# Patient Record
Sex: FEMALE | URBAN - METROPOLITAN AREA
[De-identification: names, ages, dates, MRNs, and addresses within clinical notes are randomized per-mention and may not be internally consistent; named-entity substitution may affect disease eponyms.]

---

## 2022-02-07 ENCOUNTER — OFFICE VISIT (OUTPATIENT)
Dept: URGENT CARE | Facility: URGENT CARE | Age: 32
End: 2022-02-07

## 2022-02-07 VITALS
OXYGEN SATURATION: 98 % | HEART RATE: 92 BPM | SYSTOLIC BLOOD PRESSURE: 122 MMHG | DIASTOLIC BLOOD PRESSURE: 81 MMHG | WEIGHT: 174 LBS

## 2022-02-07 DIAGNOSIS — R10.84 ABDOMINAL PAIN, GENERALIZED: Primary | ICD-10-CM

## 2022-02-07 LAB
ALBUMIN SERPL-MCNC: 3.6 G/DL (ref 3.4–5)
ALBUMIN UR-MCNC: NEGATIVE MG/DL
ALP SERPL-CCNC: 102 U/L (ref 40–150)
ALT SERPL W P-5'-P-CCNC: 17 U/L (ref 0–50)
AMYLASE SERPL-CCNC: 46 U/L (ref 30–110)
ANION GAP SERPL CALCULATED.3IONS-SCNC: 5 MMOL/L (ref 3–14)
APPEARANCE UR: CLEAR
AST SERPL W P-5'-P-CCNC: 17 U/L (ref 0–45)
BASOPHILS # BLD AUTO: 0 10E3/UL (ref 0–0.2)
BASOPHILS NFR BLD AUTO: 0 %
BILIRUB SERPL-MCNC: 0.3 MG/DL (ref 0.2–1.3)
BILIRUB UR QL STRIP: NEGATIVE
BUN SERPL-MCNC: 13 MG/DL (ref 7–30)
CALCIUM SERPL-MCNC: 9.2 MG/DL (ref 8.5–10.1)
CHLORIDE BLD-SCNC: 106 MMOL/L (ref 94–109)
CO2 SERPL-SCNC: 26 MMOL/L (ref 20–32)
COLOR UR AUTO: YELLOW
CREAT SERPL-MCNC: 0.59 MG/DL (ref 0.52–1.04)
EOSINOPHIL # BLD AUTO: 0.6 10E3/UL (ref 0–0.7)
EOSINOPHIL NFR BLD AUTO: 7 %
ERYTHROCYTE [DISTWIDTH] IN BLOOD BY AUTOMATED COUNT: 12.3 % (ref 10–15)
GFR SERPL CREATININE-BSD FRML MDRD: >90 ML/MIN/1.73M2
GLUCOSE BLD-MCNC: 140 MG/DL (ref 70–99)
GLUCOSE UR STRIP-MCNC: NEGATIVE MG/DL
HCG UR QL: NEGATIVE
HCT VFR BLD AUTO: 43.8 % (ref 35–47)
HGB BLD-MCNC: 14.1 G/DL (ref 11.7–15.7)
HGB UR QL STRIP: NEGATIVE
KETONES UR STRIP-MCNC: NEGATIVE MG/DL
LEUKOCYTE ESTERASE UR QL STRIP: NEGATIVE
LIPASE SERPL-CCNC: 97 U/L (ref 73–393)
LYMPHOCYTES # BLD AUTO: 1.9 10E3/UL (ref 0.8–5.3)
LYMPHOCYTES NFR BLD AUTO: 22 %
MCH RBC QN AUTO: 29.1 PG (ref 26.5–33)
MCHC RBC AUTO-ENTMCNC: 32.2 G/DL (ref 31.5–36.5)
MCV RBC AUTO: 91 FL (ref 78–100)
MONOCYTES # BLD AUTO: 0.6 10E3/UL (ref 0–1.3)
MONOCYTES NFR BLD AUTO: 7 %
NEUTROPHILS # BLD AUTO: 5.5 10E3/UL (ref 1.6–8.3)
NEUTROPHILS NFR BLD AUTO: 65 %
NITRATE UR QL: NEGATIVE
PH UR STRIP: 6 [PH] (ref 5–7)
PLATELET # BLD AUTO: 299 10E3/UL (ref 150–450)
POTASSIUM BLD-SCNC: 3.8 MMOL/L (ref 3.4–5.3)
PROT SERPL-MCNC: 8.2 G/DL (ref 6.8–8.8)
RBC # BLD AUTO: 4.84 10E6/UL (ref 3.8–5.2)
SODIUM SERPL-SCNC: 137 MMOL/L (ref 133–144)
SP GR UR STRIP: >=1.03 (ref 1–1.03)
UROBILINOGEN UR STRIP-ACNC: 0.2 E.U./DL
WBC # BLD AUTO: 8.6 10E3/UL (ref 4–11)

## 2022-02-07 PROCEDURE — 81003 URINALYSIS AUTO W/O SCOPE: CPT | Performed by: FAMILY MEDICINE

## 2022-02-07 PROCEDURE — 80053 COMPREHEN METABOLIC PANEL: CPT | Performed by: FAMILY MEDICINE

## 2022-02-07 PROCEDURE — 83690 ASSAY OF LIPASE: CPT | Performed by: FAMILY MEDICINE

## 2022-02-07 PROCEDURE — 82150 ASSAY OF AMYLASE: CPT | Performed by: FAMILY MEDICINE

## 2022-02-07 PROCEDURE — 36415 COLL VENOUS BLD VENIPUNCTURE: CPT | Performed by: FAMILY MEDICINE

## 2022-02-07 PROCEDURE — 85025 COMPLETE CBC W/AUTO DIFF WBC: CPT | Performed by: FAMILY MEDICINE

## 2022-02-07 PROCEDURE — 81025 URINE PREGNANCY TEST: CPT | Performed by: FAMILY MEDICINE

## 2022-02-07 PROCEDURE — 99203 OFFICE O/P NEW LOW 30 MIN: CPT | Performed by: FAMILY MEDICINE

## 2022-02-08 NOTE — PROGRESS NOTES
"SUBJECTIVE  HPI: Parvin Alfonso is a 31 year old female  who presents with the CC of abdominal/pelvic pain.   Pain is located in the generalized area, with radiation to None    The pain is characterized as \"pain\".    Pain has been present for  day(s) and is stable.     EXACERBATING FACTORS: NEGATIVE.   RELIEVING FACTORS: NEGATIVE.      No past medical history on file.  No Known Allergies  Social History     Tobacco Use     Smoking status: Not on file     Smokeless tobacco: Not on file   Substance Use Topics     Alcohol use: Not on file       ROS:CONSTITUTIONAL:NEGATIVE for fever, chills, change in weight    EXAMINATION:  /81 (BP Location: Right arm, Patient Position: Sitting, Cuff Size: Adult Regular)   Pulse 92   Wt 78.9 kg (174 lb)   SpO2 98% GENERAL APPEARANCE: healthy, alert and no distress  ABDOMEN:  soft, nontender, no HSM or masses and bowel sounds normal      ICD-10-CM    1. Abdominal pain, generalized  R10.84 CBC with Platelets & Differential     HCG Qual, Urine ([id number])     UA reflex to Microscopic and Culture     Lipase     Amylase     Comprehensive metabolic panel     omeprazole (PRILOSEC) 20 MG DR capsule       F/U PCP/IM/FP, ED if worse    "

## 2022-02-12 ENCOUNTER — APPOINTMENT (OUTPATIENT)
Dept: ULTRASOUND IMAGING | Facility: CLINIC | Age: 32
End: 2022-02-12
Attending: EMERGENCY MEDICINE

## 2022-02-12 ENCOUNTER — HOSPITAL ENCOUNTER (EMERGENCY)
Facility: CLINIC | Age: 32
Discharge: HOME | End: 2022-02-12
Attending: EMERGENCY MEDICINE | Admitting: EMERGENCY MEDICINE

## 2022-02-12 ENCOUNTER — APPOINTMENT (OUTPATIENT)
Dept: CT IMAGING | Facility: CLINIC | Age: 32
End: 2022-02-12
Attending: EMERGENCY MEDICINE

## 2022-02-12 VITALS
BODY MASS INDEX: 26.45 KG/M2 | SYSTOLIC BLOOD PRESSURE: 98 MMHG | HEIGHT: 65 IN | TEMPERATURE: 98.1 F | HEART RATE: 86 BPM | OXYGEN SATURATION: 99 % | RESPIRATION RATE: 18 BRPM | DIASTOLIC BLOOD PRESSURE: 64 MMHG | WEIGHT: 158.73 LBS

## 2022-02-12 DIAGNOSIS — R07.9 CHEST PAIN, UNSPECIFIED TYPE: ICD-10-CM

## 2022-02-12 DIAGNOSIS — R10.84 ABDOMINAL PAIN, GENERALIZED: ICD-10-CM

## 2022-02-12 LAB
ALBUMIN SERPL-MCNC: 3.8 G/DL (ref 3.4–5)
ALBUMIN UR-MCNC: NEGATIVE MG/DL
ALP SERPL-CCNC: 100 U/L (ref 40–150)
ALT SERPL W P-5'-P-CCNC: 18 U/L (ref 0–50)
ANION GAP SERPL CALCULATED.3IONS-SCNC: 7 MMOL/L (ref 3–14)
APPEARANCE UR: CLEAR
AST SERPL W P-5'-P-CCNC: 29 U/L (ref 0–45)
BASOPHILS # BLD AUTO: 0 10E3/UL (ref 0–0.2)
BASOPHILS NFR BLD AUTO: 0 %
BILIRUB SERPL-MCNC: 0.3 MG/DL (ref 0.2–1.3)
BILIRUB UR QL STRIP: NEGATIVE
BUN SERPL-MCNC: 14 MG/DL (ref 7–30)
CALCIUM SERPL-MCNC: 9 MG/DL (ref 8.5–10.1)
CHLORIDE BLD-SCNC: 105 MMOL/L (ref 94–109)
CO2 SERPL-SCNC: 24 MMOL/L (ref 20–32)
COLOR UR AUTO: ABNORMAL
CREAT SERPL-MCNC: 0.62 MG/DL (ref 0.52–1.04)
EOSINOPHIL # BLD AUTO: 0.5 10E3/UL (ref 0–0.7)
EOSINOPHIL NFR BLD AUTO: 4 %
ERYTHROCYTE [DISTWIDTH] IN BLOOD BY AUTOMATED COUNT: 12.1 % (ref 10–15)
GFR SERPL CREATININE-BSD FRML MDRD: >90 ML/MIN/1.73M2
GLUCOSE BLD-MCNC: 160 MG/DL (ref 70–99)
GLUCOSE UR STRIP-MCNC: NEGATIVE MG/DL
HCG SERPL QL: NEGATIVE
HCT VFR BLD AUTO: 41.4 % (ref 35–47)
HGB BLD-MCNC: 14 G/DL (ref 11.7–15.7)
HGB UR QL STRIP: NEGATIVE
IMM GRANULOCYTES # BLD: 0.1 10E3/UL
IMM GRANULOCYTES NFR BLD: 1 %
KETONES UR STRIP-MCNC: NEGATIVE MG/DL
LEUKOCYTE ESTERASE UR QL STRIP: ABNORMAL
LIPASE SERPL-CCNC: 84 U/L (ref 73–393)
LYMPHOCYTES # BLD AUTO: 2.7 10E3/UL (ref 0.8–5.3)
LYMPHOCYTES NFR BLD AUTO: 21 %
MCH RBC QN AUTO: 30.3 PG (ref 26.5–33)
MCHC RBC AUTO-ENTMCNC: 33.8 G/DL (ref 31.5–36.5)
MCV RBC AUTO: 90 FL (ref 78–100)
MONOCYTES # BLD AUTO: 0.6 10E3/UL (ref 0–1.3)
MONOCYTES NFR BLD AUTO: 5 %
MUCOUS THREADS #/AREA URNS LPF: PRESENT /LPF
NEUTROPHILS # BLD AUTO: 8.8 10E3/UL (ref 1.6–8.3)
NEUTROPHILS NFR BLD AUTO: 69 %
NITRATE UR QL: NEGATIVE
NRBC # BLD AUTO: 0 10E3/UL
NRBC BLD AUTO-RTO: 0 /100
PH UR STRIP: 5.5 [PH] (ref 5–7)
PLATELET # BLD AUTO: 292 10E3/UL (ref 150–450)
POTASSIUM BLD-SCNC: 3.7 MMOL/L (ref 3.4–5.3)
PROT SERPL-MCNC: 8.2 G/DL (ref 6.8–8.8)
RBC # BLD AUTO: 4.62 10E6/UL (ref 3.8–5.2)
RBC URINE: 2 /HPF
SODIUM SERPL-SCNC: 136 MMOL/L (ref 133–144)
SP GR UR STRIP: 1.01 (ref 1–1.03)
SQUAMOUS EPITHELIAL: 6 /HPF
TROPONIN I SERPL HS-MCNC: 7 NG/L
UROBILINOGEN UR STRIP-MCNC: NORMAL MG/DL
WBC # BLD AUTO: 12.8 10E3/UL (ref 4–11)
WBC URINE: 8 /HPF

## 2022-02-12 PROCEDURE — 93005 ELECTROCARDIOGRAM TRACING: CPT

## 2022-02-12 PROCEDURE — 85025 COMPLETE CBC W/AUTO DIFF WBC: CPT | Performed by: EMERGENCY MEDICINE

## 2022-02-12 PROCEDURE — 84703 CHORIONIC GONADOTROPIN ASSAY: CPT | Performed by: EMERGENCY MEDICINE

## 2022-02-12 PROCEDURE — 87086 URINE CULTURE/COLONY COUNT: CPT | Performed by: EMERGENCY MEDICINE

## 2022-02-12 PROCEDURE — 258N000003 HC RX IP 258 OP 636: Performed by: EMERGENCY MEDICINE

## 2022-02-12 PROCEDURE — 36415 COLL VENOUS BLD VENIPUNCTURE: CPT | Performed by: EMERGENCY MEDICINE

## 2022-02-12 PROCEDURE — 96361 HYDRATE IV INFUSION ADD-ON: CPT

## 2022-02-12 PROCEDURE — 76705 ECHO EXAM OF ABDOMEN: CPT

## 2022-02-12 PROCEDURE — 250N000009 HC RX 250: Performed by: EMERGENCY MEDICINE

## 2022-02-12 PROCEDURE — 81001 URINALYSIS AUTO W/SCOPE: CPT | Performed by: EMERGENCY MEDICINE

## 2022-02-12 PROCEDURE — 83690 ASSAY OF LIPASE: CPT | Performed by: EMERGENCY MEDICINE

## 2022-02-12 PROCEDURE — 250N000011 HC RX IP 250 OP 636: Performed by: EMERGENCY MEDICINE

## 2022-02-12 PROCEDURE — 96375 TX/PRO/DX INJ NEW DRUG ADDON: CPT

## 2022-02-12 PROCEDURE — 96374 THER/PROPH/DIAG INJ IV PUSH: CPT | Mod: 59

## 2022-02-12 PROCEDURE — 99285 EMERGENCY DEPT VISIT HI MDM: CPT | Mod: 25

## 2022-02-12 PROCEDURE — 80053 COMPREHEN METABOLIC PANEL: CPT | Performed by: EMERGENCY MEDICINE

## 2022-02-12 PROCEDURE — 84484 ASSAY OF TROPONIN QUANT: CPT | Performed by: EMERGENCY MEDICINE

## 2022-02-12 PROCEDURE — 74177 CT ABD & PELVIS W/CONTRAST: CPT

## 2022-02-12 RX ORDER — ONDANSETRON 2 MG/ML
4 INJECTION INTRAMUSCULAR; INTRAVENOUS
Status: DISCONTINUED | OUTPATIENT
Start: 2022-02-12 | End: 2022-02-12 | Stop reason: HOSPADM

## 2022-02-12 RX ORDER — ONDANSETRON 4 MG/1
4 TABLET, ORALLY DISINTEGRATING ORAL EVERY 6 HOURS PRN
Qty: 15 TABLET | Refills: 0 | Status: SHIPPED | OUTPATIENT
Start: 2022-02-12

## 2022-02-12 RX ORDER — ACETAMINOPHEN 500 MG
500-1000 TABLET ORAL EVERY 6 HOURS PRN
Qty: 60 TABLET | Refills: 0 | Status: SHIPPED | OUTPATIENT
Start: 2022-02-12 | End: 2022-02-20

## 2022-02-12 RX ORDER — HYDROMORPHONE HYDROCHLORIDE 1 MG/ML
0.5 INJECTION, SOLUTION INTRAMUSCULAR; INTRAVENOUS; SUBCUTANEOUS
Status: DISCONTINUED | OUTPATIENT
Start: 2022-02-12 | End: 2022-02-12 | Stop reason: HOSPADM

## 2022-02-12 RX ORDER — IOPAMIDOL 755 MG/ML
80 INJECTION, SOLUTION INTRAVASCULAR ONCE
Status: COMPLETED | OUTPATIENT
Start: 2022-02-12 | End: 2022-02-12

## 2022-02-12 RX ADMIN — ONDANSETRON 4 MG: 2 INJECTION INTRAMUSCULAR; INTRAVENOUS at 05:13

## 2022-02-12 RX ADMIN — SODIUM CHLORIDE 1000 ML: 9 INJECTION, SOLUTION INTRAVENOUS at 05:13

## 2022-02-12 RX ADMIN — SODIUM CHLORIDE 63 ML: 900 INJECTION INTRAVENOUS at 07:27

## 2022-02-12 RX ADMIN — HYDROMORPHONE HYDROCHLORIDE 0.5 MG: 1 INJECTION, SOLUTION INTRAMUSCULAR; INTRAVENOUS; SUBCUTANEOUS at 05:13

## 2022-02-12 RX ADMIN — SODIUM CHLORIDE 1000 ML: 9 INJECTION, SOLUTION INTRAVENOUS at 06:45

## 2022-02-12 RX ADMIN — IOPAMIDOL 80 ML: 755 INJECTION, SOLUTION INTRAVENOUS at 07:26

## 2022-02-12 ASSESSMENT — MIFFLIN-ST. JEOR: SCORE: 1445.88

## 2022-02-12 NOTE — ED PROVIDER NOTES
Signout taken from Dr. Islas.  Patient presented with chest and abdominal pain for about the last 10 days apparently.  She was initially hypotensive, however that resolved with IV fluids.  Her CT scan of her chest, abdomen, and pelvis does not show anything acute.  Therefore she is safe for discharge per Dr. Islas's discharge instructions.     Dano Gordon MD  02/12/22 0808

## 2022-02-12 NOTE — ED PROVIDER NOTES
"History   Chief Complaint:  Chest and abdominal pain      HPI   History supplemented by electronic chart review  Hx limited by language barrier, provided primarily by  at bedside via official     Chela Nichols is a 31 year old female who presents with her  for evaluation of chest and abdominal pain.  No prior abdominal surgeries.  Upper abdominal pain with vomiting after dinner last night, though upper abdominal pain present to various degrees for several weeks.  Omprazole prescribed 2/7/22 not helping.  LMP 2 weeks ago.  No fevers or cough.  No diarrhea or urinary symptoms.  Pain radiates up to her chest.  No history of cardiac disease.  No tobacco or alcohol.    Review of Systems  History limited by language barrier    Allergies:  No Known Allergies     Medications:    acetaminophen (TYLENOL) 500 MG tablet  ondansetron (ZOFRAN ODT) 4 MG ODT tab        Past Medical History:    No past medical history on file.    There are no problems to display for this patient.       Past Surgical History:    No past surgical history on file.     Family History:    family history is not on file.    Social History:   Here with .  Living in hotel here after immigrating from [facility] to [place] 5 months ago, coming to [place] 10 days ago.    Physical Exam     Patient Vitals for the past 24 hrs:   BP Temp Temp src Pulse Resp SpO2 Height Weight   02/12/22 0645 (!) 74/53 -- -- 82 -- 96 % -- --   02/12/22 0630 (!) 78/53 -- -- 79 -- 96 % -- --   02/12/22 0620 (!) 78/57 98.1  F (36.7  C) Oral 82 -- 97 % -- --   02/12/22 0615 (!) 84/54 -- -- 82 -- 96 % -- --   02/12/22 0610 (!) 84/48 -- -- 80 -- 96 % -- --   02/12/22 0600 -- -- -- -- -- 96 % -- --   02/12/22 0515 -- -- -- 82 18 99 % -- --   02/12/22 0500 -- -- -- 78 22 98 % -- --   02/12/22 0445 -- -- -- 79 18 98 % -- --   02/12/22 0438 -- -- -- -- 16 -- -- --   02/12/22 0437 105/74 98.4  F (36.9  C) Oral 83 -- 98 % 1.651 m (5' 5\") 72 kg (158 lb 11.7 " "oz)      Physical Exam  General: Uncomfortable appearing woman recumbent in room 8,  at bedside  HENT: mucous membranes moist  CV: rate as above, regular rhythm, no lower extremity edema, no JVD, palpable symmetric radial pulses  Resp: normal effort, speaks in full phrases, no stridor, no cough observed  GI: abdomen soft and tender in epigastrium and RUQ with equivocal Frey's sign  MSK: no bony tenderness to chest  Skin: appropriately warm and dry, no erythema or vesicles to chest wall  Neuro: alert, clear speech, oriented  Psych: cooperative    Emergency Department Course   Electrocardiogram  ECG taken at 0246, ECG interpreted at 0512 by MARIELOS Islas MD  Sinus rhythm  Rate 83 bpm. DE interval 148. QRS duration 76. QTc 425      Imaging:    US Abdomen Limited   Final Result   Addendum 1 of 1   Addendum: Impression should read    \"2. NO biliary dilatation \"      Final   IMPRESSION:   1.  \"Starry roberto\" appearance of the liver is nonspecific but may be seen in the setting of hepatitis.   2.  New biliary dilatation.            CT Chest (PE) Abdomen Pelvis w Contrast    (Results Pending)        Laboratory:  Labs Ordered and Resulted from Time of ED Arrival to Time of ED Departure   COMPREHENSIVE METABOLIC PANEL - Abnormal       Result Value    Sodium 136      Potassium 3.7      Chloride 105      Carbon Dioxide (CO2) 24      Anion Gap 7      Urea Nitrogen 14      Creatinine 0.62      Calcium 9.0      Glucose 160 (*)     Alkaline Phosphatase 100      AST 29      ALT 18      Protein Total 8.2      Albumin 3.8      Bilirubin Total 0.3      GFR Estimate >90     UA MACROSCOPIC WITH REFLEX TO MICRO AND CULTURE - Abnormal    Color Urine Light Yellow      Appearance Urine Clear      Glucose Urine Negative      Bilirubin Urine Negative      Ketones Urine Negative      Specific Gravity Urine 1.015      Blood Urine Negative      pH Urine 5.5      Protein Albumin Urine Negative      Urobilinogen Urine Normal      Nitrite Urine " Negative      Leukocyte Esterase Urine Moderate (*)     Mucus Urine Present (*)     RBC Urine 2      WBC Urine 8 (*)     Squamous Epithelials Urine 6 (*)    CBC WITH PLATELETS AND DIFFERENTIAL - Abnormal    WBC Count 12.8 (*)     RBC Count 4.62      Hemoglobin 14.0      Hematocrit 41.4      MCV 90      MCH 30.3      MCHC 33.8      RDW 12.1      Platelet Count 292      % Neutrophils 69      % Lymphocytes 21      % Monocytes 5      % Eosinophils 4      % Basophils 0      % Immature Granulocytes 1      NRBCs per 100 WBC 0      Absolute Neutrophils 8.8 (*)     Absolute Lymphocytes 2.7      Absolute Monocytes 0.6      Absolute Eosinophils 0.5      Absolute Basophils 0.0      Absolute Immature Granulocytes 0.1      Absolute NRBCs 0.0     TROPONIN I - Normal    Troponin I High Sensitivity 7     LIPASE - Normal    Lipase 84     HCG QUALITATIVE PREGNANCY - Normal    hCG Serum Qualitative Negative     HCG QUALITATIVE URINE   URINE CULTURE      Emergency Department Course:  Reviewed:  I reviewed nursing notes, vitals, and past medical history    Assessments:  I obtained history and examined the patient as noted above.          Consults:   See above in ED Course    Interventions:  Medications   ondansetron (ZOFRAN) injection 4 mg (4 mg Intravenous Given 2/12/22 0513)   HYDROmorphone (PF) (DILAUDID) injection 0.5 mg (0.5 mg Intravenous Given 2/12/22 0513)   0.9% sodium chloride BOLUS (0 mLs Intravenous Stopped 2/12/22 0640)   0.9% sodium chloride BOLUS (1,000 mLs Intravenous New Bag 2/12/22 0645)        Disposition:  Sign out to Dr. Gordon at 0645 pending CT C/A/P    Impression & Plan    Covid-19  Chela Nichols was evaluated during a global COVID-19 pandemic, which necessitated consideration that the patient might be at risk for infection with the SARS-CoV-2 virus that causes COVID-19.   Applicable protocols for evaluation were followed during the patient's care.   COVID-19 was considered as part of the patient's evaluation.  "    Medical Decision Making:  Differential diagnosis is broad and includes hepatobiliary disease such as cholecystitis, pancreatitis, bowel obstruction, pneumothorax, acute coronary syndrome and many others.  I have no prior records, noting that the patient recently came here from [facility].  Her abdominal ultrasound was initially read as \"new\" biliary dilation, the looking at the notes from the technician, this was intended to be \"no\" biliary dilation, and we have asked the radiologist to revise the interpretation.  She developed hypotension and IV fluids are being administered.  CT is pending of the chest, abdomen and pelvis, which will be followed up by my colleague Dr. Sykes was on the day shift who will determine her disposition pending her clinical course and these imaging results.    Diagnosis:    ICD-10-CM    1. Abdominal pain, generalized  R10.84    2. Chest pain, unspecified type  R07.9         Discharge Prescriptions:  New Prescriptions    ACETAMINOPHEN (TYLENOL) 500 MG TABLET    Take 1-2 tablets (500-1,000 mg) by mouth every 6 hours as needed for pain or fever    ONDANSETRON (ZOFRAN ODT) 4 MG ODT TAB    Take 1 tablet (4 mg) by mouth every 6 hours as needed for nausea       2/12/2022   MARIELOS Islas MD    This note was completed in part using Dragon voice recognition software. Although reviewed after completion, some word and grammatical errors may occur.           Mike Islas MD  02/12/22 0652    "

## 2022-02-12 NOTE — ED TRIAGE NOTES
Patient here with chest pain ongoing since 5 pm yesterday. She denies having shortness of breath and no cough and no fever. Patient also c/o abdominal pain

## 2022-02-13 LAB — BACTERIA UR CULT: NORMAL

## 2022-02-16 ENCOUNTER — ANCILLARY PROCEDURE (OUTPATIENT)
Dept: GENERAL RADIOLOGY | Facility: CLINIC | Age: 32
End: 2022-02-16
Attending: NURSE PRACTITIONER

## 2022-02-16 ENCOUNTER — OFFICE VISIT (OUTPATIENT)
Dept: URGENT CARE | Facility: URGENT CARE | Age: 32
End: 2022-02-16

## 2022-02-16 VITALS
DIASTOLIC BLOOD PRESSURE: 76 MMHG | TEMPERATURE: 99 F | RESPIRATION RATE: 21 BRPM | BODY MASS INDEX: 34.47 KG/M2 | HEART RATE: 95 BPM | OXYGEN SATURATION: 96 % | HEIGHT: 59 IN | WEIGHT: 171 LBS | SYSTOLIC BLOOD PRESSURE: 112 MMHG

## 2022-02-16 DIAGNOSIS — R10.13 EPIGASTRIC PAIN: ICD-10-CM

## 2022-02-16 DIAGNOSIS — L74.9 SWEATING ABNORMALITY: ICD-10-CM

## 2022-02-16 DIAGNOSIS — R91.8 ABNORMAL CT LUNG SCREENING: ICD-10-CM

## 2022-02-16 DIAGNOSIS — R50.9 LOW GRADE FEVER: ICD-10-CM

## 2022-02-16 DIAGNOSIS — R10.11 RUQ ABDOMINAL PAIN: ICD-10-CM

## 2022-02-16 DIAGNOSIS — Z09 HOSPITAL DISCHARGE FOLLOW-UP: Primary | ICD-10-CM

## 2022-02-16 DIAGNOSIS — R73.9 HYPERGLYCEMIA: ICD-10-CM

## 2022-02-16 LAB
ALBUMIN SERPL-MCNC: 3.7 G/DL (ref 3.4–5)
ALP SERPL-CCNC: 113 U/L (ref 40–150)
ALT SERPL W P-5'-P-CCNC: 25 U/L (ref 0–50)
AMYLASE SERPL-CCNC: 46 U/L (ref 30–110)
ANION GAP SERPL CALCULATED.3IONS-SCNC: 5 MMOL/L (ref 3–14)
AST SERPL W P-5'-P-CCNC: 28 U/L (ref 0–45)
BASOPHILS # BLD AUTO: 0 10E3/UL (ref 0–0.2)
BASOPHILS NFR BLD AUTO: 0 %
BILIRUB SERPL-MCNC: 0.3 MG/DL (ref 0.2–1.3)
BUN SERPL-MCNC: 15 MG/DL (ref 7–30)
CALCIUM SERPL-MCNC: 8.8 MG/DL (ref 8.5–10.1)
CHLORIDE BLD-SCNC: 104 MMOL/L (ref 94–109)
CO2 SERPL-SCNC: 26 MMOL/L (ref 20–32)
CREAT SERPL-MCNC: 0.58 MG/DL (ref 0.52–1.04)
EOSINOPHIL # BLD AUTO: 0.5 10E3/UL (ref 0–0.7)
EOSINOPHIL NFR BLD AUTO: 5 %
ERYTHROCYTE [DISTWIDTH] IN BLOOD BY AUTOMATED COUNT: 12.4 % (ref 10–15)
GFR SERPL CREATININE-BSD FRML MDRD: >90 ML/MIN/1.73M2
GLUCOSE BLD-MCNC: 128 MG/DL (ref 70–99)
HBA1C MFR BLD: 6.1 % (ref 0–5.6)
HCT VFR BLD AUTO: 43.7 % (ref 35–47)
HGB BLD-MCNC: 14.2 G/DL (ref 11.7–15.7)
LIPASE SERPL-CCNC: 90 U/L (ref 73–393)
LYMPHOCYTES # BLD AUTO: 2.1 10E3/UL (ref 0.8–5.3)
LYMPHOCYTES NFR BLD AUTO: 21 %
MCH RBC QN AUTO: 29.7 PG (ref 26.5–33)
MCHC RBC AUTO-ENTMCNC: 32.5 G/DL (ref 31.5–36.5)
MCV RBC AUTO: 91 FL (ref 78–100)
MONOCYTES # BLD AUTO: 0.4 10E3/UL (ref 0–1.3)
MONOCYTES NFR BLD AUTO: 4 %
NEUTROPHILS # BLD AUTO: 6.9 10E3/UL (ref 1.6–8.3)
NEUTROPHILS NFR BLD AUTO: 69 %
PLATELET # BLD AUTO: 297 10E3/UL (ref 150–450)
POTASSIUM BLD-SCNC: 3.8 MMOL/L (ref 3.4–5.3)
PROT SERPL-MCNC: 8.1 G/DL (ref 6.8–8.8)
RBC # BLD AUTO: 4.78 10E6/UL (ref 3.8–5.2)
SODIUM SERPL-SCNC: 135 MMOL/L (ref 133–144)
WBC # BLD AUTO: 9.9 10E3/UL (ref 4–11)

## 2022-02-16 PROCEDURE — 82150 ASSAY OF AMYLASE: CPT | Performed by: NURSE PRACTITIONER

## 2022-02-16 PROCEDURE — 71046 X-RAY EXAM CHEST 2 VIEWS: CPT | Performed by: RADIOLOGY

## 2022-02-16 PROCEDURE — 36415 COLL VENOUS BLD VENIPUNCTURE: CPT | Performed by: NURSE PRACTITIONER

## 2022-02-16 PROCEDURE — 83690 ASSAY OF LIPASE: CPT | Performed by: NURSE PRACTITIONER

## 2022-02-16 PROCEDURE — 99204 OFFICE O/P NEW MOD 45 MIN: CPT | Performed by: NURSE PRACTITIONER

## 2022-02-16 PROCEDURE — 83036 HEMOGLOBIN GLYCOSYLATED A1C: CPT | Performed by: NURSE PRACTITIONER

## 2022-02-16 PROCEDURE — 84439 ASSAY OF FREE THYROXINE: CPT | Performed by: NURSE PRACTITIONER

## 2022-02-16 PROCEDURE — 80050 GENERAL HEALTH PANEL: CPT | Performed by: NURSE PRACTITIONER

## 2022-02-16 RX ORDER — ACETAMINOPHEN 500 MG
500-1000 TABLET ORAL EVERY 6 HOURS PRN
COMMUNITY

## 2022-02-16 RX ORDER — ONDANSETRON 4 MG/1
4 TABLET, FILM COATED ORAL EVERY 6 HOURS PRN
COMMUNITY

## 2022-02-16 NOTE — PROGRESS NOTES
Assessment & Plan     1. Hospital discharge follow-up    - XR Chest 2 Views  - Comprehensive metabolic panel (BMP + Alb, Alk Phos, ALT, AST, Total. Bili, TP)  - CBC with platelets and differential    2. RUQ abdominal pain    - XR Chest 2 Views  - Comprehensive metabolic panel (BMP + Alb, Alk Phos, ALT, AST, Total. Bili, TP)  - CBC with platelets and differential  - Amylase  - Lipase  - Adult Gastro Ref - Consult Only; Future  - H PYLORI, STOOL, EIA  - Helicobacter pylori Antigen Stool; Future  - Helicobacter pylori Antigen Stool    3. Low grade fever    - XR Chest 2 Views  - Comprehensive metabolic panel (BMP + Alb, Alk Phos, ALT, AST, Total. Bili, TP)  - CBC with platelets and differential  - Amylase  - Lipase  - H PYLORI, STOOL, EIA  - Helicobacter pylori Antigen Stool; Future  - Helicobacter pylori Antigen Stool    4. Abnormal CT lung screening    - XR Chest 2 Views  - Comprehensive metabolic panel (BMP + Alb, Alk Phos, ALT, AST, Total. Bili, TP)  - CBC with platelets and differential  - Amylase  - Lipase  - Adult Gastro Ref - Consult Only; Future    5. Hyperglycemia    - Hemoglobin A1c    6. Epigastric pain    - Adult Gastro Ref - Consult Only; Future  - omeprazole (PRILOSEC) 20 MG DR capsule; Take 1 capsule (20 mg) by mouth daily  Dispense: 90 capsule; Refill: 0    Unclear etiology labs reviewed with patient including improved CBC, she has elevated hgb A1C prediabetes recommend she follow up and establish care with a PCP this appointment was scheduled for her today.   She will also see gastro for ongoing RUQ pain. Clear xray  [place] was [place] refuge [place] will test Hpylori.   Discussed PPI use and bland diet.       55 minutes spent on the date of the encounter doing chart review, review of outside records, review of test results, patient visit, documentation, discussion with other provider(s) and discussed with additional providers Dr. Arreguin       Patient Instructions  "Betsey Gill, ANGIE CNP  [facility] U[facility] MARCEL Martinez is a 31 year old female who presents to clinic today for the following health issues:  Chief Complaint   Patient presents with     Urgent Care     RUQ abdominal pain and belly swelling for a few days ago.      HPI  Patient states symptoms started about 3-4 weeks ago she was seen in ED on 2/12/2022(4 days ago) she had CT indicated mild hepatic steatosis. Normal liver enzymes, mild elevated WBC.     Negative diarrhea, pain increases after eating. Denies nausea or vomiting. She notes she is drinking and urinating well. Mild fever.     Abdominal Pain    Location: RUQ   Radiation: None and back.    Pain character: sharp,   Severity: 6 on a scale of 1-10.    Duration: 1 month(s)   Course of Illness: slowly progressive.  Exacerbated by: eating  Relieved by: nothing.  Associated Symptoms: see aboove.  Female : normal  Surgical History: none        No past medical history on file.  Current Outpatient Medications   Medication Sig Dispense Refill     acetaminophen (TYLENOL) 500 MG tablet Take 500-1,000 mg by mouth every 6 hours as needed for mild pain       ondansetron (ZOFRAN) 4 MG tablet Take 4 mg by mouth every 6 hours as needed for nausea       Social History     Tobacco Use     Smoking status: Never Smoker     Smokeless tobacco: Never Used   Substance Use Topics     Alcohol use: Not on file         Review of Systems  Constitutional, HEENT, cardiovascular, pulmonary, gi and gu systems are negative, except as otherwise noted.      Objective    /76   Pulse 95   Temp 99  F (37.2  C) (Tympanic)   Resp 21   Ht 1.51 m (4' 11.45\")   Wt 77.6 kg (171 lb)   SpO2 96%   BMI 34.02 kg/m    Physical Exam   GENERAL: alert, mild distress and over weight  NECK: no adenopathy, no asymmetry, masses, or scars and thyroid normal to palpation  RESP: lungs clear to auscultation - no rales, rhonchi or wheezes  CV: regular rate and " rhythm, normal S1 S2, no S3 or S4, no murmur, click or rub, no peripheral edema and peripheral pulses strong  ABDOMEN: soft, nontender, without hepatosplenomegaly or masses, tenderness epigastric and RUQ, no organomegaly or masses, liver span normal to percussion, bowel sounds normal, no palpable or pulsatile masses, no bruits heard and no palpable renal abnormalities   MS: no gross musculoskeletal defects noted, no edema

## 2022-02-18 ENCOUNTER — OFFICE VISIT (OUTPATIENT)
Dept: INTERNAL MEDICINE | Facility: CLINIC | Age: 32
End: 2022-02-18

## 2022-02-18 VITALS
BODY MASS INDEX: 35.08 KG/M2 | HEART RATE: 83 BPM | HEIGHT: 59 IN | OXYGEN SATURATION: 98 % | WEIGHT: 174 LBS | TEMPERATURE: 99.1 F

## 2022-02-18 DIAGNOSIS — L74.9 SWEATING ABNORMALITY: ICD-10-CM

## 2022-02-18 DIAGNOSIS — Z30.9 ENCOUNTER FOR CONTRACEPTIVE MANAGEMENT, UNSPECIFIED TYPE: ICD-10-CM

## 2022-02-18 DIAGNOSIS — R10.13 EPIGASTRIC PAIN: ICD-10-CM

## 2022-02-18 DIAGNOSIS — R10.11 RUQ ABDOMINAL PAIN: Primary | ICD-10-CM

## 2022-02-18 DIAGNOSIS — R73.03 PREDIABETES: ICD-10-CM

## 2022-02-18 LAB
T4 FREE SERPL-MCNC: 0.95 NG/DL (ref 0.76–1.46)
TSH SERPL DL<=0.005 MIU/L-ACNC: 4.66 MU/L (ref 0.4–4)

## 2022-02-18 PROCEDURE — 99214 OFFICE O/P EST MOD 30 MIN: CPT | Performed by: NURSE PRACTITIONER

## 2022-02-18 PROCEDURE — 87338 HPYLORI STOOL AG IA: CPT | Performed by: NURSE PRACTITIONER

## 2022-02-18 NOTE — PATIENT INSTRUCTIONS
-Continue taking the omeprazole daily  -Tylenol 2 tablets every 6 hours as needed for pain  -Zofran 1 pill every 6 hours as needed for nausea and vomiting  -Referral to GI - stomach specialist- someone will call you for an appointment  -Referral to OB GYN for birth control implant  -Follow up with Bailey in 1 month

## 2022-02-18 NOTE — PROGRESS NOTES
"  Assessment & Plan     RUQ abdominal pain  Epigastric pain  - Reports sudden onset of abdominal pain in epigastric and right upper quadrant.  Her work-up thus far has been unrevealing.  She had an unrevealing right upper quadrant abdominal ultrasound.  Her liver labs are unrevealing.  I suspect her symptoms are related to GERD, other differentials include H. pylori, gallbladder pathology, versus other.  - Patient did submit her H. pylori stool antigen to the lab today, will await results.  - Continue PPI daily- may take some time to work.    - Reviewed diet-this has been incredibly challenging as the patient is not eating the normal foods that she normally would back home.  She is eating basically with a hotel provides for her.  It is possible that some of her GI issues are related to her change in diet.  - GI referral-would likely benefit from an EGD if she has no improvement from her PPI  - Return PRN symptoms worsen/fail to improve  - F/u 1 month with me  - Adult Gastro Ref - Consult Only    Sweating abnormality  - TSH with free T4 reflex    Encounter for contraceptive management, unspecified type  - Ob/Gyn Referral    Pre-Diabetes  - A1c 6.1%.  Continue to monitor for now    Of note, patient reportedly told the medical assistant that she will be leaving [place] in 10 days to travel to [place] where she will stay as she has family there.  If she does, she will need to follow-up in [place] for her ongoing issues.       BMI:   Estimated body mass index is 35.14 kg/m  as calculated from the following:    Height as of this encounter: 1.499 m (4' 11\").    Weight as of this encounter: 78.9 kg (174 lb).       See Patient Instructions    Return in about 1 month (around 3/18/2022), or if symptoms worsen or fail to improve, for follow up abdominal pain.    ANGIE Ordonez CNP  [facility] is a 31 year old who presents for the following health " issues  accompanied by her Her young son.    HPI     ED/UC Followup:    Facility:  [facility]  Date of visit: 2-16-22  Reason for visit: hospital follow up-  Current Status: Still has pain     Epigastric pain:  Right upper quadrant pain:  Patient is seen in clinic today for a follow-up ER visit.  Presented with concerns of chest and abdominal pain.  The emergency room notes were noted.  She underwent an ultrasound and a CT PE study, those were unrevealing.  Of note, the CT PE study showed that the starry roberto appearance of the liver was likely due to calcified granulomas noted on the CT study.  Labs are unrevealing.  Her urine looked infectious however her culture was negative.  She was not treated with antibiotics.    She was seen in the urgent care for a follow-up on 2/16/2022.  Labs reviewed, her A1c was noted to be 6.1%.  Other labs including a CMP, CBC, amylase, lipase were within normal limits.  Follow-up chest x-ray was negative.  She was referred to GI.    Patient reports today that she continues to have pain in her epigastric and right upper quadrant abdomen.  Endorses bloating, and burning pain.  She reports that she had never had this type of pain before and it came on rather suddenly.  She does report that the pain worsens after she eats.  She denies any nausea or vomiting but notes that her pain again worsens after eating.  She reports that her bowels are moving without difficulty.  She states she is voiding fine.  She denies fevers or chills however, states that occasionally she sweats.    The patient came from [facility] recently.  She was initially housed in [place] and then came to [place].  She reports that back in [facility] she did have issues with intermittent low back pain, which she attributed to upkeep of a large house and animals on her property.  She endorses that sometimes she would have to go in to have treatment for this, but it is unclear what treatment she received.    US  "Limited:                                                             IMPRESSION:  1.  \"Starry roberto\" appearance of the liver is nonspecific but may be seen in the setting of hepatitis.  2.  New biliary dilatation.    CT PE:  IMPRESSION:   1.  No pulmonary emboli. Normal thoracic aorta.  2.  Possible mild nonspecific bronchial inflammation.  3.  Mild diffuse hepatic steatosis.  4.  Benign calcified granulomas in the liver account for echogenic foci at today's earlier ultrasound.  5.  Chest, abdomen and pelvis otherwise normal.    Contraception:  Would like a referral to OB/GYN for contraceptive implant.  States she does not want to get pregnant at this time.      Review of Systems   CONSTITUTIONAL:NEGATIVE for fever, chills, change in weight  INTEGUMENTARY/SKIN: NEGATIVE for worrisome rashes, moles or lesions  EYES: NEGATIVE for vision changes or irritation  ENT/MOUTH: NEGATIVE for ear, mouth and throat problems  RESP:NEGATIVE for significant cough or SOB  CV: NEGATIVE for chest pain, palpitations or peripheral edema  GI: POSITIVE for abdominal pain epigastric and RUQ  MUSCULOSKELETAL: NEGATIVE for significant arthralgias or myalgia      Objective    Pulse 83   Temp 99.1  F (37.3  C) (Tympanic)   Ht 1.499 m (4' 11\")   Wt 78.9 kg (174 lb)   LMP 01/28/2022 (Exact Date)   SpO2 98%   Breastfeeding No   BMI 35.14 kg/m    Body mass index is 35.14 kg/m .     Physical Exam   GENERAL APPEARANCE: healthy, alert and no distress  EYES: Eyes grossly normal to inspection, PERRL and conjunctivae and sclerae normal  HENT: ear canals and TM's normal and nose and mouth without ulcers or lesions  NECK: no adenopathy, no asymmetry, masses, or scars and thyroid normal to palpation  RESP: lungs clear to auscultation - no rales, rhonchi or wheezes  CV: regular rates and rhythm, normal S1 S2, no S3 or S4 and no murmur, click or rub  ABDOMEN: Positive bowel sounds, soft, obese.  Patient is moderately tender to palpation in her " epigastric and mildly tender to palpation in her right upper quadrant.  Remainder of abdominal exam is unrevealing  MS: extremities normal- no gross deformities noted  SKIN: no suspicious lesions or rashes  PSYCH: mentation appears normal and affect normal/bright    -Reviewed ER visit, lab results, imaging results only.  Reviewed urgent care visit results, and x-ray results

## 2022-02-21 LAB — H PYLORI AG STL QL IA: POSITIVE

## 2022-02-22 DIAGNOSIS — A04.8 H. PYLORI INFECTION: Primary | ICD-10-CM

## 2022-02-22 RX ORDER — CLARITHROMYCIN 500 MG
500 TABLET ORAL 2 TIMES DAILY
Qty: 28 TABLET | Refills: 0 | Status: SHIPPED | OUTPATIENT
Start: 2022-02-22 | End: 2022-03-08

## 2022-02-22 RX ORDER — AMOXICILLIN 500 MG/1
1000 CAPSULE ORAL 2 TIMES DAILY
Qty: 56 CAPSULE | Refills: 0 | Status: SHIPPED | OUTPATIENT
Start: 2022-02-22 | End: 2022-03-08